# Patient Record
Sex: MALE
[De-identification: names, ages, dates, MRNs, and addresses within clinical notes are randomized per-mention and may not be internally consistent; named-entity substitution may affect disease eponyms.]

---

## 2020-11-10 PROBLEM — Z00.00 ENCOUNTER FOR PREVENTIVE HEALTH EXAMINATION: Status: ACTIVE | Noted: 2020-11-10

## 2020-11-11 ENCOUNTER — NON-APPOINTMENT (OUTPATIENT)
Age: 75
End: 2020-11-11

## 2020-11-11 ENCOUNTER — APPOINTMENT (OUTPATIENT)
Dept: CARDIOLOGY | Facility: CLINIC | Age: 75
End: 2020-11-11
Payer: MEDICARE

## 2020-11-11 VITALS
TEMPERATURE: 97.3 F | RESPIRATION RATE: 16 BRPM | HEIGHT: 67 IN | WEIGHT: 190 LBS | HEART RATE: 75 BPM | DIASTOLIC BLOOD PRESSURE: 76 MMHG | BODY MASS INDEX: 29.82 KG/M2 | OXYGEN SATURATION: 97 % | SYSTOLIC BLOOD PRESSURE: 143 MMHG

## 2020-11-11 DIAGNOSIS — I77.89 OTHER SPECIFIED DISORDERS OF ARTERIES AND ARTERIOLES: ICD-10-CM

## 2020-11-11 DIAGNOSIS — Z87.891 PERSONAL HISTORY OF NICOTINE DEPENDENCE: ICD-10-CM

## 2020-11-11 DIAGNOSIS — M54.9 DORSALGIA, UNSPECIFIED: ICD-10-CM

## 2020-11-11 DIAGNOSIS — Z82.49 FAMILY HISTORY OF ISCHEMIC HEART DISEASE AND OTHER DISEASES OF THE CIRCULATORY SYSTEM: ICD-10-CM

## 2020-11-11 DIAGNOSIS — H26.9 UNSPECIFIED CATARACT: ICD-10-CM

## 2020-11-11 DIAGNOSIS — Z78.9 OTHER SPECIFIED HEALTH STATUS: ICD-10-CM

## 2020-11-11 PROCEDURE — 99204 OFFICE O/P NEW MOD 45 MIN: CPT

## 2020-11-11 PROCEDURE — 93000 ELECTROCARDIOGRAM COMPLETE: CPT | Mod: NC

## 2020-11-11 PROCEDURE — 99072 ADDL SUPL MATRL&STAF TM PHE: CPT

## 2020-11-11 NOTE — ASSESSMENT
[FreeTextEntry1] : EKG: Sinus rhythm with no significant ST or T wave changes.\par \par 75-year-old man with a past medical history of hypertension, dyslipidemia, thoracic aortic enlargement who presents to me for evaluation prior to undergoing carpal tunnel surgery.  Patient appears to be asymptomatic from a cardiac standpoint.  He does well above 4 METS of activity regularly without a problem.  No evidence of ischemia or heart failure at this time.  Blood pressure appears to be reasonably controlled although a little bit elevated and he should be monitoring it more closely at home.  Lipids have also been going up despite the fact that his hyperlipidemic therapy has apparently not changed.  I would not make any adjustments for now but plan to see him in follow-up after his surgery for which there is no cardiac contraindication at this time.

## 2020-11-11 NOTE — HISTORY OF PRESENT ILLNESS
[FreeTextEntry1] : Patient presents today having not been seen in almost 10 years.  He returns now because he needs evaluation prior to undergoing carpal tunnel surgery.  Since I last saw him his medications for hypertension and dyslipidemia have been adjusted.  He reports that his blood pressures usually run in the 120s to 140s when they are checked but he has not been checking them at home.  He remains active doing a lot of walking, at least an hour and a half a day without a problem.  He also does a lot of gardening and mowing his lawn without any problem.  Patient denies chest pain, shortness of breath, palpitations, orthopnea, presyncope, syncope.

## 2020-11-11 NOTE — DISCUSSION/SUMMARY
[FreeTextEntry1] : 1.  Insert surgery proceed with surgery as planned.\par 2.  Monitor through surgery until stable postoperatively.\par 3.  Continue current meds for hypertension and dyslipidemia.\par 4.  Monitor BP at home, keep a log and bring to f/u.\par 5.  Patient encouraged to work on diet to lose weight.\par 6.  Patient is encouraged to exercise at least 30 minutes a day everyday of the week.\par 7.  Follow-up in 3 months with reevaluation with echocardiogram and carotid Doppler at that time.

## 2021-01-15 ENCOUNTER — APPOINTMENT (OUTPATIENT)
Dept: CARDIOLOGY | Facility: CLINIC | Age: 76
End: 2021-01-15
Payer: MEDICARE

## 2021-01-15 PROCEDURE — 99072 ADDL SUPL MATRL&STAF TM PHE: CPT

## 2021-01-15 PROCEDURE — 93306 TTE W/DOPPLER COMPLETE: CPT

## 2021-01-15 PROCEDURE — 93880 EXTRACRANIAL BILAT STUDY: CPT

## 2021-01-15 RX ORDER — PERFLUTREN 6.52 MG/ML
6.52 INJECTION, SUSPENSION INTRAVENOUS
Qty: 2 | Refills: 0 | Status: COMPLETED | OUTPATIENT
Start: 2021-01-15

## 2021-01-15 RX ADMIN — PERFLUTREN MG/ML: 6.52 INJECTION, SUSPENSION INTRAVENOUS at 00:00

## 2021-02-08 ENCOUNTER — NON-APPOINTMENT (OUTPATIENT)
Age: 76
End: 2021-02-08

## 2021-02-08 ENCOUNTER — APPOINTMENT (OUTPATIENT)
Dept: CARDIOLOGY | Facility: CLINIC | Age: 76
End: 2021-02-08
Payer: MEDICARE

## 2021-02-08 VITALS
OXYGEN SATURATION: 93 % | DIASTOLIC BLOOD PRESSURE: 76 MMHG | HEART RATE: 71 BPM | BODY MASS INDEX: 29.82 KG/M2 | WEIGHT: 190 LBS | HEIGHT: 67 IN | SYSTOLIC BLOOD PRESSURE: 149 MMHG | RESPIRATION RATE: 16 BRPM | TEMPERATURE: 97.1 F

## 2021-02-08 DIAGNOSIS — I51.7 CARDIOMEGALY: ICD-10-CM

## 2021-02-08 PROCEDURE — 93000 ELECTROCARDIOGRAM COMPLETE: CPT

## 2021-02-08 PROCEDURE — 99214 OFFICE O/P EST MOD 30 MIN: CPT

## 2021-02-08 PROCEDURE — 99072 ADDL SUPL MATRL&STAF TM PHE: CPT

## 2021-02-08 RX ORDER — LISINOPRIL 40 MG/1
40 TABLET ORAL
Qty: 90 | Refills: 1 | Status: DISCONTINUED | COMMUNITY
Start: 2021-01-03 | End: 2021-02-08

## 2021-02-08 RX ORDER — LOSARTAN POTASSIUM 100 MG/1
TABLET, FILM COATED ORAL
Refills: 0 | Status: DISCONTINUED | COMMUNITY
End: 2021-02-08

## 2021-02-08 NOTE — HISTORY OF PRESENT ILLNESS
[FreeTextEntry1] : Patient returns today having successfully had his surgery for carpal tunnel release.  Surgery was successful although he is still having some soreness in the hand.  His numbness resolved immediately.  He otherwise feels well and offers no physical complaints at this time.  He is not do any regular exercise but reports no physical limitations in his activity.  When he checks his blood pressure at home it has been in the 120s to 130s over 80s.  He takes his medication without a problem.  Patient denies chest pain, shortness of breath, palpitations, orthopnea, presyncope, syncope.

## 2021-02-08 NOTE — ASSESSMENT
[FreeTextEntry1] : Echocardiogram January 15, 2021 demonstrated left ventricle normal size and function with ejection fraction of 60 to 65%.  Mild concentric LVH.  Mildly dilated left atrium with borderline dilated right atrium.  Mild to moderate aortic stenosis.  Trace mitral and mild tricuspid regurgitation.\par \par Carotid Doppler January 15, 2021 showed moderate plaque on the right with less than 50% stenosis.  Minimal plaque on the left.\par \par EKG: Sinus rhythm with no significant ST or T wave changes.\par \par 75-year-old man with a past medical history of hypertension, dyslipidemia, thoracic aortic enlargement who presents to me for follow-up evaluation.  Patient appears to be stable from a cardiac standpoint.  He tolerated the surgery well.  Echocardiogram shows some evidence of early hypertensive heart disease with LVH and left atrial enlargement.  He also has mild to moderate aortic stenosis.  His blood pressure appears to be somewhat elevated I will adjust his medication at this time, but encouraged him to monitor his blood pressure regularly.  Additionally his lipids are elevated on his most recent blood work and I will increase his statin as well.  Ultimately work with diet, exercise and weight loss would help as well.

## 2021-02-08 NOTE — PHYSICAL EXAM
[General Appearance - In No Acute Distress] : no acute distress [Normal Conjunctiva] : the conjunctiva exhibited no abnormalities [Normal Oral Mucosa] : normal oral mucosa [Auscultation Breath Sounds / Voice Sounds] : lungs were clear to auscultation bilaterally [Abdomen Soft] : soft [Abdomen Tenderness] : non-tender [Abnormal Walk] : normal gait [Cyanosis, Localized] : no localized cyanosis [Nail Clubbing] : no clubbing of the fingernails [Skin Color & Pigmentation] : normal skin color and pigmentation [Oriented To Time, Place, And Person] : oriented to person, place, and time [Affect] : the affect was normal [Normal Rate] : normal [Rhythm Regular] : regular [Normal S1] : normal S1 [Normal S2] : normal S2 [II] : a grade 2 [FreeTextEntry1] : No JVD, no carotid bruits. [S3] : no S3 [S4] : no S4

## 2021-04-26 ENCOUNTER — APPOINTMENT (OUTPATIENT)
Dept: CARDIOLOGY | Facility: CLINIC | Age: 76
End: 2021-04-26
Payer: MEDICARE

## 2021-04-26 ENCOUNTER — NON-APPOINTMENT (OUTPATIENT)
Age: 76
End: 2021-04-26

## 2021-04-26 VITALS
SYSTOLIC BLOOD PRESSURE: 160 MMHG | WEIGHT: 194 LBS | HEART RATE: 63 BPM | TEMPERATURE: 97.8 F | RESPIRATION RATE: 16 BRPM | HEIGHT: 67 IN | BODY MASS INDEX: 30.45 KG/M2 | DIASTOLIC BLOOD PRESSURE: 82 MMHG

## 2021-04-26 VITALS — DIASTOLIC BLOOD PRESSURE: 76 MMHG | SYSTOLIC BLOOD PRESSURE: 140 MMHG

## 2021-04-26 DIAGNOSIS — E66.9 OBESITY, UNSPECIFIED: ICD-10-CM

## 2021-04-26 PROCEDURE — 93000 ELECTROCARDIOGRAM COMPLETE: CPT

## 2021-04-26 PROCEDURE — 99214 OFFICE O/P EST MOD 30 MIN: CPT

## 2021-04-26 PROCEDURE — 99072 ADDL SUPL MATRL&STAF TM PHE: CPT

## 2021-04-26 NOTE — DISCUSSION/SUMMARY
[FreeTextEntry1] : 1.  No additional cardiac testing at this time.\par 2.  Continue current cardiac meds in doses as noted above for hypertension and dyslipidemia.\par 3.  Repeat blood work at this time.\par 4.  Monitor BP at home, keep a log and bring to f/u.  He will bring his machine in to be calibrated as well.\par 5.  Patient encouraged to work on diet to lose weight.\par 6.  Patient is encouraged to exercise at least 30 minutes a day everyday of the week.\par 7.  Follow up here in 3 months.

## 2021-04-26 NOTE — ASSESSMENT
[FreeTextEntry1] : Echocardiogram January 15, 2021 demonstrated left ventricle normal size and function with ejection fraction of 60 to 65%.  Mild concentric LVH.  Mildly dilated left atrium with borderline dilated right atrium.  Mild to moderate aortic stenosis.  Trace mitral and mild tricuspid regurgitation.\par \par Carotid Doppler January 15, 2021 showed moderate plaque on the right with less than 50% stenosis.  Minimal plaque on the left.\par \par EKG: Sinus rhythm with no significant ST or T wave changes.\par \par 75-year-old man with a past medical history of hypertension, dyslipidemia, thoracic aortic enlargement who presents to me for follow-up evaluation.  Patient appears to be stable from a cardiac standpoint.  Patient's blood pressure may still be a bit elevated after the change in his medication but seems to still be better at home.  I have asked him once again to bring the machine on his list when he comes at follow-up we will make no additional changes for now.  I encouraged him to work on weight loss and to continue with his exercise as well.

## 2021-04-26 NOTE — HISTORY OF PRESENT ILLNESS
[FreeTextEntry1] : Patient presents back today noting that he has been urinating more since the change in his medications.  This causes him some concern but he is willing to continue the medication for now.  He walks for about an hour a day doing about 18-minute miles.  He has been checking his blood pressure at home and feels that it is normally controlled but did not bring a list with him here today and does not really know the numbers very well.  Patient denies chest pain, shortness of breath, palpitations, orthopnea, presyncope, syncope.

## 2021-07-20 ENCOUNTER — NON-APPOINTMENT (OUTPATIENT)
Age: 76
End: 2021-07-20

## 2021-07-20 ENCOUNTER — APPOINTMENT (OUTPATIENT)
Dept: CARDIOLOGY | Facility: CLINIC | Age: 76
End: 2021-07-20
Payer: MEDICARE

## 2021-07-20 VITALS
BODY MASS INDEX: 29.98 KG/M2 | HEIGHT: 67 IN | WEIGHT: 191 LBS | RESPIRATION RATE: 16 BRPM | SYSTOLIC BLOOD PRESSURE: 142 MMHG | HEART RATE: 74 BPM | DIASTOLIC BLOOD PRESSURE: 75 MMHG

## 2021-07-20 VITALS — SYSTOLIC BLOOD PRESSURE: 135 MMHG | DIASTOLIC BLOOD PRESSURE: 69 MMHG

## 2021-07-20 DIAGNOSIS — E78.00 PURE HYPERCHOLESTEROLEMIA, UNSPECIFIED: ICD-10-CM

## 2021-07-20 DIAGNOSIS — I65.29 OCCLUSION AND STENOSIS OF UNSPECIFIED CAROTID ARTERY: ICD-10-CM

## 2021-07-20 DIAGNOSIS — I10 ESSENTIAL (PRIMARY) HYPERTENSION: ICD-10-CM

## 2021-07-20 PROCEDURE — 99214 OFFICE O/P EST MOD 30 MIN: CPT

## 2021-07-20 PROCEDURE — 99072 ADDL SUPL MATRL&STAF TM PHE: CPT

## 2021-07-20 PROCEDURE — 93000 ELECTROCARDIOGRAM COMPLETE: CPT

## 2021-07-20 NOTE — DISCUSSION/SUMMARY
[FreeTextEntry1] : 1.  No additional cardiac testing at this time.\par 2.  Increase atorvastatin to 80 mg daily.  Repeat blood work in 4 to 6 weeks.\par 3.  Continue current cardiac meds in doses as noted above for hypertension.\par 4.  Monitor BP at home, keep a log and bring to f/u.  He will bring his machine in to be calibrated as well.\par 5.  Patient encouraged to work on diet to lose weight.\par 6.  Patient is encouraged to exercise at least 30 minutes a day everyday of the week.\par 7.  I would plan follow-up with the patient in about 6 months but will make an appointment now as he is planning to move.  If he is still here in 6 months he will make an appointment.  If there are any concerns or questions he will call me.  Once he establishes care I will be happy to forward all records.

## 2021-07-20 NOTE — PHYSICAL EXAM
[Normal Conjunctiva] : the conjunctiva exhibited no abnormalities [General Appearance - In No Acute Distress] : no acute distress [Normal Oral Mucosa] : normal oral mucosa [Auscultation Breath Sounds / Voice Sounds] : lungs were clear to auscultation bilaterally [Abdomen Soft] : soft [Abdomen Tenderness] : non-tender [Abnormal Walk] : normal gait [Nail Clubbing] : no clubbing of the fingernails [Cyanosis, Localized] : no localized cyanosis [Skin Color & Pigmentation] : normal skin color and pigmentation [Oriented To Time, Place, And Person] : oriented to person, place, and time [Affect] : the affect was normal [Normal Rate] : normal [Rhythm Regular] : regular [Normal S1] : normal S1 [Normal S2] : normal S2 [II] : a grade 2 [FreeTextEntry1] : No JVD, no carotid bruits. [S3] : no S3 [S4] : no S4

## 2021-07-20 NOTE — HISTORY OF PRESENT ILLNESS
[FreeTextEntry1] : Patient presents back today feeling well and offering no complaints.  He is planning a move to [place] to place he already owns but in a permanent fashion.  His house is under contract.  He reports blood pressures at home in the 120s to low 130s over 70s.  He is tolerating his medication without problem.  He remains active and is able to do so with no limitations but is not really doing a lot of concerted exercise.  Patient denies chest pain, shortness of breath, palpitations, orthopnea, presyncope, syncope.

## 2021-07-20 NOTE — ASSESSMENT
[FreeTextEntry1] : Echocardiogram January 15, 2021 demonstrated left ventricle normal size and function with ejection fraction of 60 to 65%.  Mild concentric LVH.  Mildly dilated left atrium with borderline dilated right atrium.  Mild to moderate aortic stenosis.  Trace mitral and mild tricuspid regurgitation.\par \par Carotid Doppler January 15, 2021 showed moderate plaque on the right with less than 50% stenosis.  Minimal plaque on the left.\par \par EKG: Sinus rhythm with no significant ST or T wave changes.\par \par 75-year-old man with a past medical history of hypertension, dyslipidemia, thoracic aortic enlargement who presents to me for follow-up evaluation.  Patient appears to be stable from a cardiac standpoint.  Blood pressures appear to be adequately controlled for now on his home machine which appears to be reading accurately.  I will make no changes.  His lipids remain elevated despite being on 40 mg of atorvastatin.  I will increase that to 80 mg and repeat blood work in 4 to 6 weeks.  He is planning a move to [place] and will establish care down there as soon as he arrives to continue the medication.  If he has any side effects he will let me know.  I have also encouraged him to increase his exercise.

## 2021-08-18 ENCOUNTER — RX RENEWAL (OUTPATIENT)
Age: 76
End: 2021-08-18

## 2021-10-16 ENCOUNTER — RX RENEWAL (OUTPATIENT)
Age: 76
End: 2021-10-16

## 2022-01-18 ENCOUNTER — RX RENEWAL (OUTPATIENT)
Age: 77
End: 2022-01-18

## 2022-01-18 RX ORDER — ATORVASTATIN CALCIUM 80 MG/1
80 TABLET, FILM COATED ORAL
Qty: 90 | Refills: 1 | Status: ACTIVE | COMMUNITY
Start: 2021-10-16 | End: 1900-01-01

## 2022-02-10 RX ORDER — TELMISARTAN AND HYDROCHLOROTHIAZIDE 80; 25 MG/1; MG/1
80-25 TABLET ORAL
Qty: 30 | Refills: 0 | Status: ACTIVE | COMMUNITY
Start: 2021-02-08 | End: 1900-01-01

## 2022-02-14 ENCOUNTER — RX RENEWAL (OUTPATIENT)
Age: 77
End: 2022-02-14

## 2022-09-08 ENCOUNTER — APPOINTMENT (RX ONLY)
Dept: URBAN - METROPOLITAN AREA CLINIC 162 | Facility: CLINIC | Age: 77
Setting detail: DERMATOLOGY
End: 2022-09-08

## 2022-09-08 DIAGNOSIS — L81.4 OTHER MELANIN HYPERPIGMENTATION: ICD-10-CM

## 2022-09-08 DIAGNOSIS — L82.1 OTHER SEBORRHEIC KERATOSIS: ICD-10-CM | Status: WORSENING

## 2022-09-08 DIAGNOSIS — L56.8 OTHER SPECIFIED ACUTE SKIN CHANGES DUE TO ULTRAVIOLET RADIATION: ICD-10-CM

## 2022-09-08 DIAGNOSIS — L85.3 XEROSIS CUTIS: ICD-10-CM

## 2022-09-08 DIAGNOSIS — L57.8 OTHER SKIN CHANGES DUE TO CHRONIC EXPOSURE TO NONIONIZING RADIATION: ICD-10-CM

## 2022-09-08 DIAGNOSIS — D18.0 HEMANGIOMA: ICD-10-CM

## 2022-09-08 PROBLEM — D18.01 HEMANGIOMA OF SKIN AND SUBCUTANEOUS TISSUE: Status: ACTIVE | Noted: 2022-09-08

## 2022-09-08 PROCEDURE — ? ADDITIONAL NOTES

## 2022-09-08 PROCEDURE — ? COUNSELING

## 2022-09-08 PROCEDURE — 99203 OFFICE O/P NEW LOW 30 MIN: CPT

## 2022-09-08 PROCEDURE — ? TREATMENT REGIMEN

## 2022-09-08 ASSESSMENT — LOCATION DETAILED DESCRIPTION DERM
LOCATION DETAILED: RIGHT LATERAL MALAR CHEEK
LOCATION DETAILED: LEFT MEDIAL MALAR CHEEK
LOCATION DETAILED: RIGHT MEDIAL MALAR CHEEK
LOCATION DETAILED: LEFT PROXIMAL PRETIBIAL REGION
LOCATION DETAILED: SUPERIOR THORACIC SPINE
LOCATION DETAILED: LEFT LATERAL MALAR CHEEK
LOCATION DETAILED: RIGHT SUPERIOR LATERAL NECK
LOCATION DETAILED: RIGHT PROXIMAL PRETIBIAL REGION

## 2022-09-08 ASSESSMENT — LOCATION ZONE DERM
LOCATION ZONE: FACE
LOCATION ZONE: TRUNK
LOCATION ZONE: LEG
LOCATION ZONE: NECK

## 2022-09-08 ASSESSMENT — LOCATION SIMPLE DESCRIPTION DERM
LOCATION SIMPLE: UPPER BACK
LOCATION SIMPLE: LEFT PRETIBIAL REGION
LOCATION SIMPLE: RIGHT PRETIBIAL REGION
LOCATION SIMPLE: RIGHT CHEEK
LOCATION SIMPLE: LEFT CHEEK
LOCATION SIMPLE: RIGHT ANTERIOR NECK

## 2022-09-08 NOTE — PROCEDURE: ADDITIONAL NOTES
Detail Level: Simple
Additional Notes: Patient unsatisfied with appearance of seborrheic keratoses. \\nExplained that lesions can be cosmetically treated, however, patient is very tan and will have scarring.
Render Risk Assessment In Note?: no